# Patient Record
(demographics unavailable — no encounter records)

---

## 2025-03-13 NOTE — HISTORY OF PRESENT ILLNESS
[FreeTextEntry1] : F/u for diabetes management  *** Mar 13, 2025 ***  feels well, exercising daily (treadmill, bike, weights) staying on [employer] REVA U-200 70 units HS, Mychal ac B+L (13-15-17-44--22-23-24), ac D (3-67-11-15-16-17-18-19-20),  Mounjaro 5 mg qw,  Metformin 1 g bid, actos 15 mg qd ,  Jardiance 25 mg qd ----------------------------- Dexcom Clarity ----------------------------- Mick Charmaine   YOB: 1963   Generated at: Thu, Mar 13, 2025 3:32 PM EDT   Reporting period: Fri Feb 28, 2025 - Thu Mar 13, 2025 ----------------------------- Glucose Details   Average glucose: 123 mg/dL   GMI: 6.3%   Standard deviation: 23 mg/dL   Coefficient of Variation: 18.3% ----------------------------- Time in Range   Very High: 0%   High: 1%   In Range: 99%   Low: 0%   Very Low: 0%  Target Range  mg/dL  ----------------------------- Sensor usage   Days with data: 13/14   Time active: 92%   Avg. calibrations per day: 0.7   *** Aug 13, 2024 ***  feels well, returned from  vacation, diet has been worse  but was able to walk a lot remains on  Tresiba U-200 70 units HS,  Mychal ac B+L (13-15-17-62--22-23-24), ac D (1-15-41-15-16-17-18-19-20),  Mounjaro 5 mg qw ,  Metformin 1 g bid ,  actos 15 mg qd ,  Jardiance 25 mg qd tolerates Mounjaro well  wearing Dexcom G7 Date of download:  08/13/2024  Diabetes Medications and Dosage: as above Indication for CGMS: verify a change in the treatment regimen, identify periods of hypoglycemia/ hyperglycemia.  Modal day report: pattern.  Pt with HYPO  0% of the time ( NONE below 54),  94% in target range Hyperglycemia:  6% elevation  Identified issues: carbohydrate counting dates analyzed: 07/31/2024 - 08/13/2024   *** Apr 09, 2024 ***  diet has been a bit worse- traveling a lot , visiting colleges with his son  otherwise, doing well, offers no new c/o taking Tresiba U-200 70 units HS,  Lyumjev ac B+L (13-15-17-52-47-50-22-23-24), ac D (9-82-01-15-16-17-18-19-20),  Mounjaro 5 mg qw (tolerates this dose well, but with GI effects on a higher dose),  Metformin 1 g bid,  actos 15 mg qd ,  Jardiance 25 mg qd  wearing Dexcom G7 Date of download:  04/09/2024  Diabetes Medications and Dosage: as above Indication for CGMS: verify a change in the treatment regimen, identify periods of hypoglycemia/ hyperglycemia.  Modal day report: pattern.  Pt with HYPO  <1% of the time ( NONE below 54),  88% in target range Hyperglycemia:  11% elevation  Identified issues: carbohydrate counting dates analyzed: 03/27/2024 - 04/09/2024  labs from PCP (Dr. Herring) done on 02/01/24- a1c- 6.2%, TSH- 1.57, LDL- 34, HDL- 40, 25D- 33.6, negative urine microlabumin  *** Dec 13, 2023 ***  feels great, denies any c/o staying on Tresiba U-200 80 units HS,  Lyumjev ac B+L (13-15-17-31-68-90-22-23-24), ac D (4-32-22-15-16-17-18-19-20),  Mounjaro 7.5 mg qw,  Metformin 1 g bid ,  actos 15 mg qd,  Jardiance 25 mg qd  wearing Dexcom G7 Date of download:  12/13/2023  Diabetes Medications and Dosage: as above Indication for CGMS: verify a change in the treatment regimen, identify periods of hypoglycemia/ hyperglycemia.  Modal day report: pattern.  Pt with HYPO  <1% of the time ( <1% below 54),  99% in target range Hyperglycemia:  <1% elevation  Identified issues: carbohydrate counting dates analyzed: 11/30/2023 - 12/13/2023  *** Aug 01, 2023 ***  feels great. much more energy. no new c/o taking Tresiba U-200 80 units HS,  Lyumjev ac B+L (13-15-17-22-88-17-22-23-24), ac D (9-11-13-15-16-17-18-19-20),  Mounjaro 7.5 mg qw, Metformin 1 g bid,  actos 15 mg qd, Jardiance 25 mg qd  wearing Dexcom G7 Date of download:  08/01/2023  Diabetes Medications and Dosage: as above Indication for CGMS: verify a change in the treatment regimen, identify periods of hypoglycemia/ hyperglycemia.  Modal day report: pattern.  Pt with HYPO  <1% of the time ( <1% below 54),  91% in target range Hyperglycemia:  8% elevation  Identified issues: carbohydrate counting dates analyzed: 07/19/2023 - 08/01/2023   *** May 02, 2023 ***  Feels well overall, denies new c/o lost 7lbs and reports sugar better controlled on Mounjaro 5mg weekly injection, Tresiba U-200 86 units HS,  Lyumjev ac B+L (13-15-17-75-35-88-22-23-24), ac D (0-29-86-15-16-17-18-19-20),  Metformin 1 g bid, actos 15mg, Rosuvastatin 10mg qhs Mr Montano reports typical -130mg/dL.    FS in office today is 75 mg/dL approximately 5 hours after eating He is wearing dexcom 6 cgm Date of download:  05/02/2023 Diabetes Medications and Dosage: as above Indication for CGMS: verify a change in the treatment regimen, identify periods of hypoglycemia/ hyperglycemia. Modal day report: pattern. Pt with HYPO  1% of the time ( NONE below 54),  95% in target range Hyperglycemia:  4% elevation Identified issues: carbohydrate counting dates analyzed: 04/19/2023 - 05/02/2023  *** Feb 13, 2023 ***  feels well, no new c/o. gained weight during the holidays, but started exercising more recently started on crestor 5 mg qd by his PCP about 2 months ago staying on Tresiba U-200 80 units HS,  Lyumjev ac B+L (13-15-17-24--22-23-24), ac D (9-11-13-15-16-17-18-19-20),  Rybelsus 14 mg , Metformin 1 g bid,  actos 15 mg qd ,  Jardiance 25 mg qd  Date of download:  02/13/2023  Diabetes Medications and Dosage: as above Indication for CGMS: verify a change in the treatment regimen, identify periods of hypoglycemia/ hyperglycemia.  Modal day report: pattern.  Pt with HYPO  % of the time ( NONE below 54),  % in target range Hyperglycemia:  % elevation  Identified issues: carbohydrate counting dates analyzed:  - 02/13/2023    *** Oct 19, 2022 ***  feels well, no new changes on Tresiba U-200 80 units HS,  Lyumjev ac B+L (13-15-17-60--22-23-24), ac D (9-11-13-15-16-17-18-19-20), Rybelsus 14 mg, Metformin 1 g bid ,  actos 15 mg qd,  Jardiance 25 mg qd wearing Amari  Date of download:  10/19/2022  Diabetes Medications and Dosage: as above Indication for CGMS: verify a change in the treatment regimen, identify periods of hypoglycemia/ hyperglycemia.  Modal day report: pattern.  Pt with HYPO  <1% of the time ( NONE below 54),  96% in target range Hyperglycemia:  3% elevation  Identified issues: carbohydrate counting dates analyzed:  10/07/2022- 10/19/2022   *** May 26, 2022 ***  taking Tresiba U-200 80 units HS,  Lyumjev 17-17-11,  Rybelsus 14 mg ,  Jardiance 25 mg qd, Metformin 1 g bid,  actos 15 mg qd wearing Dexcom  Date of download:  5/26/22 Diabetes Medications and Dosage: as above Indication for CGMS: verify a change in the treatment regimen, identify periods of hypoglycemia/ hyperglycemia.  Modal day report: pattern.  Pt with HYPO  0% of the time (NONE below 54),  82% in target range Hyperglycemia:  18% elevation  Identified issues: carbohydrate counting dates analyzed:  5/13/22-5/26/22   *** Feb 14, 2022 ***  feels well saw Dr. Pierre taking Tresiba U-200 76 units HS,  Lyumjev 13-13-11,  Rybelsus 14 mg ,  Invokana 300 mg qd, Metformin 1 g bid,  actos 15 mg qd wearing Dexcom  Date of download:  2/14/22 Diabetes Medications and Dosage: as above Indication for CGMS: verify a change in the treatment regimen, identify periods of hypoglycemia/ hyperglycemia.  Modal day report: pattern.  Hypoglycemia: patterned, Pt with HYPO <1% of the time (NONE below 54), 84% in target range Hyperglycemia: 15% elevation  Identified issues: carbohydrate counting  dates analyzed: 2/1/22-2/14/22   *** Sep 08, 2021 ***  on Tresiba U-200 76 units HS,  Lyumjev 13-13-11 (sometimes skips ac meals insulin), Rybelsus 14 mg ,  Invokana 300 mg qd, Metformin 1 g bid , Actos 30 mg qd wearing dexcom Date of download:  9/8/21 Diabetes Medications and Dosage: as above Indication for CGMS: verify a change in the treatment regimen, identify periods of hypoglycemia/ hyperglycemia.  Modal day report: pattern.  Pt with HYPO  0% of the time (NONE below 54),  80% in target range Hyperglycemia:  20% elevation  Identified issues: carbohydrate counting, ac lunch spiking dates analyzed: 8/26/21-9/8/21   *** Apr 14, 2021 ***  on Tresiba U-200 80 units HS,  Lyumjev 11 in tid ac, Rybelsus 14 mg qd,  Invokana 300 mg qd, Metformin 1 g bid  wearing Amari 2 Date of download:  4/14/21 Diabetes Medications and Dosage: as above Indication for CGMS: verify a change in the treatment regimen, identify periods of hypoglycemia/ hyperglycemia.  Modal day report: pattern.  Pt with HYPO  0% of the time (NONE below 54),  79% in target range Hyperglycemia:  21% elevation  Identified issues: carbohydrate counting dates analyzed: 4/1/21-4/14/21  haven't seen a liver specialist yet  HISTORY OF PRESENT ILLNESS.   Mr. MONTANO was diagnosed with Diabetes Mellitus Type 2 in 1998. He reports history HTN, dyslipidemia, fatty liver disease and chronically elevated transaminases and denies CAD,  known complications of retinopathy, nephropathy, or neuropathy. He's been under care of the hepatologist in the p[ast, but has not been seen in several years. He is presently on Invokana 300 mg qd, Metformin 1000 mg bid, Glipizide 5 mg qd, Toujeo 80 un HS, Trulicity 1.5 mg qw, Benicar-HCT- 40/12.5. He was on actos and avandia at some point , which worked well, but he stopped because of concerns of side effects. He's not presently on statins. He states that did not see any improvement with adding Trulicity and complains of the pain at the injection sites. Blood sugars are checked 2 times a day.  Did not bring log book, but reported are typically as following: fbs- in 160's, ppg- in mid to upper 200's Hypoglycemia frequency:  Fingerstick glucose in the office today is 159 mg/dL 3 hours after eating.  Diet: not following ADA Exercise: biking, weight lifting  Lab review: a1c- 9.0  Last dilated eye - 01/24, scheduled for next month Last podiatry visit  - 2023 Last cardiology evaluation - (Dr. Erickson) 09/24 Last stress test - 09/24 Last 2-D Echo -09/24 Last nephrology evaluation - none Last neurology evaluation- none

## 2025-03-13 NOTE — ASSESSMENT
[Diabetes Foot Care] : diabetes foot care [Long Term Vascular Complications] : long term vascular complications of diabetes [Carbohydrate Consistent Diet] : carbohydrate consistent diet [Importance of Diet and Exercise] : importance of diet and exercise to improve glycemic control, achieve weight loss and improve cardiovascular health [Exercise/Effect on Glucose] : exercise/effect on glucose [Hypoglycemia Management] : hypoglycemia management [Glucagon Use] : glucagon use [Ketone Testing] : ketone testing [Action and use of Insulin] : action and use of short and long-acting insulin [Self Monitoring of Blood Glucose] : self monitoring of blood glucose [Insulin Self-Administration] : insulin self-administration [Injection Technique, Storage, Sharps Disposal] : injection technique, storage, and sharps disposal [Sick-Day Management] : sick-day management [Retinopathy Screening] : Patient was referred to ophthalmology for retinopathy screening [Diabetic Medications] : Risks and benefits of diabetic medications were discussed [FreeTextEntry1] : T2DM, well controlled - Tresiba U-200 70 units HS - Lyumjev ac B+L (13-15-17-57-41-49-22-23-24), ac D (0-07-55-15-16-17-18-19-20) - Mounjaro 5 mg qw (will not  uptitrate b/o GI distress on the higher dose) - Metformin 1 g bid and monitor liver enzymes - actos 15 mg qd ; he will benefit given his underlying fatty liver disease - Jardiance 25 mg qd - monitor lipids - Dexcom G7 - we prev discussed insulin pumps, but he's reluctant at present. - obtain the most recent reports on ST/echo  RTC 4-6 mos.